# Patient Record
Sex: MALE | Race: WHITE | NOT HISPANIC OR LATINO | Employment: FULL TIME | URBAN - NONMETROPOLITAN AREA
[De-identification: names, ages, dates, MRNs, and addresses within clinical notes are randomized per-mention and may not be internally consistent; named-entity substitution may affect disease eponyms.]

---

## 2024-03-28 ENCOUNTER — OFFICE VISIT (OUTPATIENT)
Dept: INTERNAL MEDICINE | Facility: CLINIC | Age: 24
End: 2024-03-28
Payer: COMMERCIAL

## 2024-03-28 VITALS
WEIGHT: 315 LBS | TEMPERATURE: 99.2 F | HEIGHT: 72 IN | BODY MASS INDEX: 42.66 KG/M2 | DIASTOLIC BLOOD PRESSURE: 90 MMHG | OXYGEN SATURATION: 100 % | HEART RATE: 86 BPM | SYSTOLIC BLOOD PRESSURE: 119 MMHG

## 2024-03-28 DIAGNOSIS — F41.9 ANXIETY: ICD-10-CM

## 2024-03-28 DIAGNOSIS — Z00.00 ROUTINE GENERAL MEDICAL EXAMINATION AT A HEALTH CARE FACILITY: Primary | ICD-10-CM

## 2024-03-28 NOTE — PROGRESS NOTES
Chief Complaint   Patient presents with    Annual Exam     Discuss anxiety  and mental health, discuss about getting prep       Kevon Pope is a 23 y.o. male and is here for a comprehensive physical exam. The patient reports problems - anxiety .  Complains of some stressors at home but not at work says he has a good social support system.  Denies significant alcohol use and is a social drinker only.  Denies tobacco use   History:  Erectile dysfunction  no  Nocturia  no      Do you take any herbs or supplements that were not prescribed by a doctor? yes    Are you taking aspirin daily? no      Health Habits:  Dental Exam. unknown  Eye Exam. unknown  Exercise: 0 times/week.  Current exercise activities include: none    Health Maintenance   Topic Date Due    BMI FOLLOWUP  Never done    HEPATITIS C SCREENING  Never done    ANNUAL PHYSICAL  Never done    COVID-19 Vaccine (3 - 2023-24 season) 03/30/2024 (Originally 9/1/2023)    INFLUENZA VACCINE  03/31/2024 (Originally 8/1/2023)    TDAP/TD VACCINES (2 - Td or Tdap) 03/28/2025 (Originally 7/11/2022)    Pneumococcal Vaccine 0-64  Aged Out       PMH, PSH, SocHx, FamHx, Allergies, and Medications: Reviewed and updated in the Visit Navigator.     Allergies   Allergen Reactions    Penicillins Hives     History reviewed. No pertinent past medical history.  Past Surgical History:   Procedure Laterality Date    WISDOM TOOTH EXTRACTION  2018     Social History     Socioeconomic History    Marital status: Single   Tobacco Use    Smoking status: Never    Smokeless tobacco: Never   Vaping Use    Vaping status: Never Used   Substance and Sexual Activity    Alcohol use: Yes     Comment: once every 2 to 3 months    Drug use: Yes     Types: Marijuana    Sexual activity: Not Currently     Partners: Male     Family History   Problem Relation Age of Onset    Asthma Mother     No Known Problems Father        Review of Systems  Review of Systems   Constitutional: Negative.  Negative  "for activity change, appetite change, fatigue and fever.   HENT:  Negative for congestion, ear discharge, ear pain and trouble swallowing.    Eyes:  Negative for photophobia and visual disturbance.   Respiratory:  Negative for cough and shortness of breath.    Cardiovascular:  Negative for chest pain and palpitations.   Gastrointestinal:  Negative for abdominal distention, abdominal pain, constipation, diarrhea, nausea and vomiting.   Endocrine: Negative.    Genitourinary:  Negative for dysuria, hematuria and urgency.   Musculoskeletal:  Negative for arthralgias, back pain, joint swelling and myalgias.   Skin:  Negative for color change and rash.   Allergic/Immunologic: Negative.    Neurological:  Negative for dizziness, weakness, light-headedness and headaches.   Hematological:  Negative for adenopathy. Does not bruise/bleed easily.   Psychiatric/Behavioral:  Positive for dysphoric mood. Negative for agitation and confusion. The patient is nervous/anxious.        Vitals:    03/28/24 0956   BP: 119/90   Pulse: 86   Temp: 99.2 °F (37.3 °C)   SpO2: 100%       Objective   /90   Pulse 86   Temp 99.2 °F (37.3 °C) (Infrared)   Ht 182.9 cm (72\")   Wt (!) 144 kg (317 lb 12 oz)   SpO2 100%   BMI 43.09 kg/m²     Physical Exam  Constitutional:       General: He is not in acute distress.     Appearance: He is well-developed.   HENT:      Nose: Nose normal.   Eyes:      General: No scleral icterus.     Conjunctiva/sclera: Conjunctivae normal.   Neck:      Thyroid: No thyromegaly.      Trachea: No tracheal deviation.   Cardiovascular:      Rate and Rhythm: Normal rate and regular rhythm.      Heart sounds: No murmur heard.     No friction rub.   Pulmonary:      Effort: No respiratory distress.      Breath sounds: No wheezing or rales.   Abdominal:      General: There is no distension.      Palpations: Abdomen is soft. There is no mass.      Tenderness: There is no abdominal tenderness. There is no guarding. "   Musculoskeletal:         General: No deformity. Normal range of motion.   Lymphadenopathy:      Cervical: No cervical adenopathy.   Skin:     General: Skin is warm and dry.      Findings: No erythema or rash.   Neurological:      Mental Status: He is alert and oriented to person, place, and time.      Cranial Nerves: No cranial nerve deficit.      Coordination: Coordination normal.      Deep Tendon Reflexes: Reflexes are normal and symmetric.   Psychiatric:         Behavior: Behavior normal.         Thought Content: Thought content normal.         Judgment: Judgment normal.       The CVD Risk score (ANNAMARIA'Agostino, et al., 2008) failed to calculate for the following reasons:    The 2008 CVD risk score is only valid for ages 30 to 74    Lab Results   Component Value Date    CHLPL 145 06/24/2015    TRIG 97 06/24/2015    HDL 32 (L) 06/24/2015    LDL 94 06/24/2015     Glucose   Date Value Ref Range Status   06/24/2015 80 74 - 98 mg/dL Final     BUN   Date Value Ref Range Status   06/24/2015 11 7 - 20 mg/dL Final     Creatinine   Date Value Ref Range Status   06/24/2015 0.8 0.6 - 1.3 mg/dL Final     Sodium   Date Value Ref Range Status   06/24/2015 140 137 - 145 mmol/L Final     Potassium   Date Value Ref Range Status   06/24/2015 4.5 3.5 - 5.1 mmol/L Final     Chloride   Date Value Ref Range Status   06/24/2015 101 98 - 107 mmol/L Final     CO2   Date Value Ref Range Status   06/24/2015 26 26 - 30 mmol/L Final     Calcium   Date Value Ref Range Status   06/24/2015 9.7 8.4 - 10.2 mg/dL Final     Total Protein   Date Value Ref Range Status   06/24/2015 7.2 6.3 - 8.2 g/dL Final     Albumin   Date Value Ref Range Status   06/24/2015 4.2 3.5 - 5.0 g/dL Final     ALT (SGPT)   Date Value Ref Range Status   06/24/2015 61 13 - 69 U/L Final     AST (SGOT)   Date Value Ref Range Status   06/24/2015 45 15 - 46 U/L Final     Alkaline Phosphatase   Date Value Ref Range Status   06/24/2015 109 38 - 126 U/L Final     Total Bilirubin  "  Date Value Ref Range Status   06/24/2015 0.6 0.2 - 1.3 mg/dL Final     A/G Ratio   Date Value Ref Range Status   06/24/2015 1.4 1.0 - 2.0 Final     BUN/Creatinine Ratio   Date Value Ref Range Status   06/24/2015 14.6 6.3 - 21.9 Final     Anion Gap   Date Value Ref Range Status   06/24/2015 17 10 - 20 mmol/L Final     No results found for: \"WBC\", \"HGB\", \"HCT\", \"MCV\", \"PLT\"    Assessment & Plan   1. Healthy male exam.   2. Patient Counseling: Including but not Limited to the following, when appropriate:  --Nutrition: Stressed importance of moderation in sodium/caffeine intake, saturated fat and cholesterol, caloric balance, sufficient intake of fresh fruits, vegetables, fiber  .  --Exercise: Stressed the importance of regular exercise.   --Substance Abuse: Discussed cessation/primary prevention of tobacco, alcohol, or other drug use; driving or other dangerous activities under the influence; availability of treatment for abuse, as indicated based on social history.    --Sexuality: Discussed sexually transmitted diseases, partner selection, use of condoms and contraceptive alternatives.   --Injury prevention: Discussed safety belts, safety helmets, smoke detector, smoking near bedding or upholstery.   --Dental health: Discussed importance of regular tooth brushing, flossing, and dental visits.  --Immunizations reviewed.        3. Discussed the patient's BMI with him. Class 3 Severe Obesity (BMI >=40). Obesity-related health conditions include the following: none. Obesity is newly identified. BMI is is above average; BMI management plan is completed. We discussed low calorie, low carb based diet program, portion control, and increasing exercise.  . The BMI is above average; BMI management plan is completed  4. No follow-ups on file.  5. Age-appropriate Screening Scheduled  6. There are no Patient Instructions on file for this visit.    Assessment & Plan     Diagnoses and all orders for this visit:    1. Routine " general medical examination at a health care facility (Primary)  -     Comprehensive Metabolic Panel  -     Hemoglobin A1c  -     TSH Rfx On Abnormal To Free T4  -     Lipid Panel  -     Hepatitis C Antibody    2. Anxiety counseled patient in detail.  Discussed sleep hygiene start exercise program counseled about weight loss.  Does not show any suicidal ideation.  Holding off on medications for now until he is evaluated by behavioral health  -     Ambulatory Referral to Psychiatry

## 2024-03-29 LAB
ALBUMIN SERPL-MCNC: 4.6 G/DL (ref 3.5–5.2)
ALBUMIN/GLOB SERPL: 1.8 G/DL
ALP SERPL-CCNC: 72 U/L (ref 39–117)
ALT SERPL-CCNC: 29 U/L (ref 1–41)
AST SERPL-CCNC: 18 U/L (ref 1–40)
BILIRUB SERPL-MCNC: 0.6 MG/DL (ref 0–1.2)
BUN SERPL-MCNC: 12 MG/DL (ref 6–20)
BUN/CREAT SERPL: 16.4 (ref 7–25)
CALCIUM SERPL-MCNC: 9.4 MG/DL (ref 8.6–10.5)
CHLORIDE SERPL-SCNC: 105 MMOL/L (ref 98–107)
CHOLEST SERPL-MCNC: 166 MG/DL (ref 0–200)
CO2 SERPL-SCNC: 25.8 MMOL/L (ref 22–29)
CREAT SERPL-MCNC: 0.73 MG/DL (ref 0.76–1.27)
EGFRCR SERPLBLD CKD-EPI 2021: 131.1 ML/MIN/1.73
GLOBULIN SER CALC-MCNC: 2.5 GM/DL
GLUCOSE SERPL-MCNC: 90 MG/DL (ref 65–99)
HBA1C MFR BLD: 5.3 % (ref 4.8–5.6)
HCV IGG SERPL QL IA: NON REACTIVE
HDLC SERPL-MCNC: 42 MG/DL (ref 40–60)
LDLC SERPL CALC-MCNC: 113 MG/DL (ref 0–100)
POTASSIUM SERPL-SCNC: 4.3 MMOL/L (ref 3.5–5.2)
PROT SERPL-MCNC: 7.1 G/DL (ref 6–8.5)
SODIUM SERPL-SCNC: 141 MMOL/L (ref 136–145)
TRIGL SERPL-MCNC: 57 MG/DL (ref 0–150)
TSH SERPL DL<=0.005 MIU/L-ACNC: 1.42 UIU/ML (ref 0.27–4.2)
VLDLC SERPL CALC-MCNC: 11 MG/DL (ref 5–40)

## 2024-05-19 NOTE — PROGRESS NOTES
"Chief Complaint  Anxiety, mild depression, family conflict, and cannabis use      Subjective          Kevon Pope presents to [facility] by himself for an initial evaluation. The patient was referred by Dr. Monet.    History of Present Illness: \"Ferny\" states, \"I mean, I have been feeling very-especially in the recent months like within the last year-feeling super anxious about things.\" Ferny tells me that even small things will cause a lot of anxiety for him.  He begins to worry his thoughts changed from the current situation to things that happened in the past, is very emotional and begins to cry.  Eventually, everything seems to do better.  He tells me this likely began after his mother made a comment about him dating a female friend and was exacerbated by a break up with his male partner. He started thinking that she may never seen him for who he truly is. He reports having low self-esteem beginning at an early age. He feels as if he is failing in some way now because he lives at home and did not go to college. He believes his parents are disappointed in him for his sexual orientation and their Zoroastrianism beliefs. He has heard derogatory comments among family members in reference to adhikari individuals. He reports symptoms of depression too. He previously had suicidal thoughts that were intense with a plan to drive his car into a tree. The thoughts now are passive ideations without a plan and are more of a desire to escape such as, \"I wish I wouldn't wake up.\" His mood has been improving since going to the gym. He goes daily and works out for a few hours. He is close with co-workers and finds them to be a positive support. He is changing dietary habits since working out. He has always been a binge-eater or overeats due to emotions or boredom, but he is eating less and changing his diet to fuel his body. He is sleeping better too. He used to have trouble sleeping, but falls " "asleep and stays asleep now. He is not taking any psychiatric medication. He denies any SI/HI/AVH.    Past Psychiatric History: Ferny denies any past history of psychiatric care about therapy or medications.  He has never been admitted to an inpatient psychiatric hospital.  He has never had any suicide attempts.  He previously had a suicidal ideation to drive himself into a tree.  Any suicidal thoughts now are passive with more of a desire to escape.  He identifies his friends and his cat as protective against suicide.  He denies any history of self-harm.    Substance Use/Abuse: Ferny admits to drinking alcohol, socially.  He may have a cocktail with friends.  At most, he has 3 drinks per episode.  He denies any consequences on his health, relationships, or occupation.  He denies any legal consequences.  He denies any cravings for alcohol.  Ferny admits to cannabis use.  He smokes the flower.  His use has declined over time since he is getting healthier and he typically uses about 3 times per week.  He reports smoking \"blunts 4 bowls.\"  He rates his cravings for cannabis a 5 out of 10 with 10 being the highest.  He once had a.  Of abstinence for 2 weeks and denies any problems stopping.  He denies the use of nicotine or other recreational drugs.    Family Psychiatric History: Ferny tells me that his mother's side of the family has many mental health issues.  He reports that his biological mother has anxiety.  He reports a maternal uncle being in recovery from alcohol use.  He reports a maternal aunt being a \"alcoholic.\"    Developmental History: Ferny believes he was born on time via vaginal delivery in [place], [place].  He has been raised by his biological parents who are .  He does not have any siblings.  He believes he met all of his developmental milestones time.  His grandparents were very involved when he was younger.  He has always done very well in school and reports having a GPA between 3.5 and " "4.0.  He has his high school diploma.  He has considered going to college for videography or found making.  He reports having a strained relationship with his parents due to his sexual orientation and their Buddhist beliefs.  He denies any disciplinary problems in the past.  He tells me he has always been very good at making and keeping friends.  He began dating females in middle school and high school.  His first same-sex relationship was as an adult.  It lasted for 2 months.    Social History: Ferny currently lives in [place], [place] with his biological parents.  He is working at [employer], full-time.  He is interested in returning to college perhaps for videography or filmmaking.  He is interested in the creative process of making music or films.  He also likes photography.  He enjoys playing video games, watching movies with friends, going to the gym, hiking, and taking care of his cat-Sushi.  He reports the weekly use of cannabis, the occasional use of alcohol, and denies the use of nicotine or recreational drugs.    Objective   Vital Signs:   /88   Pulse 92   Ht 182.9 cm (72\")   Wt (!) 142 kg (313 lb)   SpO2 99%   BMI 42.45 kg/m²       PHQ-9 Score:   PHQ-9 Total Score: 8     JOEY-7 Score:  JOEY 7 Total Score: 7    MENTAL STATUS EXAM   General Appearance:  Cleanly groomed and dressed and well developed  Eye Contact:  Good eye contact  Attitude:  Cooperative  Motor Activity:  Normal gait, posture  Muscle Strength:  Normal  Speech:  Normal rate, tone, volume  Language:  Spontaneous  Mood and affect:  Anxious (tearful at times)  Hopelessness:  3  Loneliness: 3  Thought Process:  Logical, goal-directed and linear  Associations/ Thought Content:  No delusions  Hallucinations:  None  Suicidal Ideations:  Passive ideation  Homicidal Ideation:  Not present  Sensorium:  Alert and clear  Orientation:  Person, place, time and situation  Immediate Recall, Recent, and Remote Memory:  Intact  Attention Span/ " Concentration:  Good  Fund of Knowledge:  Appropriate for age and educational level  Intellectual Functioning:  Average range  Insight:  Fair  Judgement:  Good  Reliability:  Good  Impulse Control:  Fair       Current Medications:   Current Outpatient Medications   Medication Sig Dispense Refill    FLUoxetine (PROzac) 10 MG capsule Take 1 capsule by mouth Daily for 14 days, THEN 2 capsules Daily for 14 days. 42 capsule 0     No current facility-administered medications for this visit.   Physical Exam  Vitals and nursing note reviewed.   Constitutional:       Appearance: Normal appearance. He is obese.   Neurological:      General: No focal deficit present.      Mental Status: He is alert and oriented to person, place, and time.        Result Review :     The following data was reviewed by: XAVI Adam on 05/23/2024:    Hepatitis C Antibody (03/28/2024 10:44)  Lipid Panel (03/28/2024 10:44)  TSH Rfx On Abnormal To Free T4 (03/28/2024 10:44)  Hemoglobin A1c (03/28/2024 10:44)  Comprehensive Metabolic Panel (03/28/2024 10:44)   Office Visit with Darwin Monte MD (03/28/2024)     Assessment and Plan    Diagnoses and all orders for this visit:    1. JOEY (generalized anxiety disorder) (Primary)  -     FLUoxetine (PROzac) 10 MG capsule; Take 1 capsule by mouth Daily for 14 days, THEN 2 capsules Daily for 14 days.  Dispense: 42 capsule; Refill: 0    2. Mild depression  -     FLUoxetine (PROzac) 10 MG capsule; Take 1 capsule by mouth Daily for 14 days, THEN 2 capsules Daily for 14 days.  Dispense: 42 capsule; Refill: 0    3. Conflict between patient and family    4. Cannabis use with anxiety disorder         Impression:  -This is an initial evaluation of the patient. Ferny is a single, 23-year-old  male who presents by himself for a medication evaluation.  Ferny was referred by his primary physician for anxiety.  He tells me anxiety has been increasing over the last year and has become beyond  his control.  It interferes with daily functioning and limits him.  He would consider going back to school, but reports a fear of failure.  He discusses activities that he enjoys, but is limited with engaging in these activities due to fear.  He reports feelings of low self-esteem and lack of confidence.  He tells me this has been exacerbated by his parents Sabianist beliefs and feelings towards the adhikari community, as well as a recent break-up with a male partner.  He reports symptoms of depression too, but going to the gym and implementing a healthy lifestyle has been improving his mood.  He is sleeping better because of his healthy lifestyle.  He works full-time and reports having good coworkers and friends.  Ferny has many positive factors in his life which will help his progress.  He is interested in therapy and learning how to love himself and set realistic expectations for himself.  Today, we explored options for therapy including treatment here at [facility].  I also provided the reference [employer] in the event he would like to search the Internet for a local provider.  We discussed medication management as his anxiety levels are very high and interfering with daily functioning.  I suggested a daily medication like Prozac and an as needed medication like propranolol if needed.  I explained the mechanism of action, purpose, risk, benefits, and potential adverse effects of both medications.  He feels Prozac would be beneficial for him since it does not have a sedating factor.  However, he does not feel he needs an as needed medication at this time.  Ferny endorses passive suicidal ideations at times with more of a death wish.  He agrees to safety plan and identifies protective factors like his best friend having a baby and his cat, Dirk.  Discussed his cannabis use and I did encourage abstinence if possible as there are likely interactions with the chemicals and the cannabis.  He may consider  this and his use has significantly decreased since implementing healthy lifestyle measures.  -Initiate Prozac 10 mg daily for 2 weeks then increase to 20 mg daily for depression and anxiety.  -Consider therapy.    TREATMENT PLAN/GOALS: Continue supportive psychotherapy efforts and medications as indicated. Treatment and medication options discussed during today's visit. Patient ackowledged and verbally consented to continue with current treatment plan and was educated on the importance of compliance with treatment and follow-up appointments.    MEDICATION ISSUES:    We discussed risks, benefits, and side effects of the above medications and the patient was agreeable with the plan. Patient was educated on the importance of compliance with treatment and follow-up appointments.  Patient is agreeable to call the office with any worsening of symptoms or onset of side effects. Patient is agreeable to call 911 or go to the nearest ER should he/she begin having SI/HI.      Counseled patient regarding multimodal approach with healthy nutrition, healthy sleep, regular physical activity, social activities, counseling, and medications.      Coping skills reviewed and encouraged positive framing of thoughts     Assisted patient in processing above session content; acknowledged and normalized patient's thoughts, feelings, and concerns.  Applied  positive coping skills and behavior management in session.  Allowed patient to freely discuss issues without interruption or judgment. Provided safe, confidential environment to facilitate the development of positive therapeutic relationship and encourage open, honest communication. Assisted patient in identifying risk factors which would indicate the need for higher level of care including thoughts to harm self or others and/or self-harming behavior and encouraged patient to contact this office, call 911, or present to the nearest emergency room should any of these events occur.  Discussed crisis intervention services and means to access.     MEDS ORDERED DURING VISIT:  New Medications Ordered This Visit   Medications    FLUoxetine (PROzac) 10 MG capsule     Sig: Take 1 capsule by mouth Daily for 14 days, THEN 2 capsules Daily for 14 days.     Dispense:  42 capsule     Refill:  0           Follow Up   Return in about 4 weeks (around 6/20/2024) for Medication Check.    Patient was given instructions and counseling regarding his condition or for health maintenance advice. Please see specific information pulled into the AVS if appropriate.     This document has been electronically signed by XAVI Adam  May 23, 2024 11:47 EDT      This document has been electronically signed by XAVI Loya, PMHNP-BC  May 23, 2024 11:47 EDT    Part of this note may be an electronic transcription/translation of spoken language to printed text using the Dragon Dictation System.

## 2024-05-23 ENCOUNTER — OFFICE VISIT (OUTPATIENT)
Dept: PSYCHIATRY | Facility: CLINIC | Age: 24
End: 2024-05-23
Payer: COMMERCIAL

## 2024-05-23 VITALS
HEART RATE: 92 BPM | WEIGHT: 313 LBS | HEIGHT: 72 IN | BODY MASS INDEX: 42.39 KG/M2 | DIASTOLIC BLOOD PRESSURE: 88 MMHG | OXYGEN SATURATION: 99 % | SYSTOLIC BLOOD PRESSURE: 116 MMHG

## 2024-05-23 DIAGNOSIS — F12.980 CANNABIS USE WITH ANXIETY DISORDER: ICD-10-CM

## 2024-05-23 DIAGNOSIS — Z63.9 CONFLICT BETWEEN PATIENT AND FAMILY: ICD-10-CM

## 2024-05-23 DIAGNOSIS — F32.A MILD DEPRESSION: ICD-10-CM

## 2024-05-23 DIAGNOSIS — F41.1 GAD (GENERALIZED ANXIETY DISORDER): Primary | ICD-10-CM

## 2024-05-23 PROCEDURE — 90792 PSYCH DIAG EVAL W/MED SRVCS: CPT | Performed by: NURSE PRACTITIONER

## 2024-05-23 RX ORDER — FLUOXETINE 10 MG/1
CAPSULE ORAL
Qty: 42 CAPSULE | Refills: 0 | Status: SHIPPED | OUTPATIENT
Start: 2024-05-23 | End: 2024-06-20

## 2024-05-23 SDOH — SOCIAL STABILITY - SOCIAL INSECURITY: PROBLEM RELATED TO PRIMARY SUPPORT GROUP, UNSPECIFIED: Z63.9

## 2024-07-02 ENCOUNTER — OFFICE VISIT (OUTPATIENT)
Dept: PSYCHIATRY | Facility: CLINIC | Age: 24
End: 2024-07-02
Payer: COMMERCIAL

## 2024-07-02 DIAGNOSIS — F41.1 GAD (GENERALIZED ANXIETY DISORDER): Primary | ICD-10-CM

## 2024-07-02 DIAGNOSIS — F32.A MILD DEPRESSION: ICD-10-CM

## 2024-07-02 PROCEDURE — 90791 PSYCH DIAGNOSTIC EVALUATION: CPT | Performed by: CASE MANAGER/CARE COORDINATOR
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
## 2024-07-08 ENCOUNTER — TELEPHONE (OUTPATIENT)
Dept: PSYCHIATRY | Facility: CLINIC | Age: 24
End: 2024-07-08
Payer: COMMERCIAL

## 2024-07-08 DIAGNOSIS — F32.A MILD DEPRESSION: ICD-10-CM

## 2024-07-08 DIAGNOSIS — F41.1 GAD (GENERALIZED ANXIETY DISORDER): ICD-10-CM

## 2024-07-08 RX ORDER — FLUOXETINE HYDROCHLORIDE 20 MG/1
20 CAPSULE ORAL DAILY
Qty: 30 CAPSULE | Refills: 2 | Status: SHIPPED | OUTPATIENT
Start: 2024-07-08

## 2024-07-08 NOTE — TELEPHONE ENCOUNTER
Patient called he is out of Prozac 10 mg and needs a refill. He did not increase to 20 mg but feels the increase would help. Please send the Prozac 20 mg to [facility].

## 2024-09-27 ENCOUNTER — OFFICE VISIT (OUTPATIENT)
Dept: PSYCHIATRY | Facility: CLINIC | Age: 24
End: 2024-09-27
Payer: COMMERCIAL

## 2024-09-27 DIAGNOSIS — F41.1 GAD (GENERALIZED ANXIETY DISORDER): ICD-10-CM

## 2024-09-27 DIAGNOSIS — F32.A MILD DEPRESSION: Primary | ICD-10-CM

## 2024-09-30 NOTE — PROGRESS NOTES
"Chief Complaint  Anxiety, mild depression, family conflict, and cannabis use       Subjective          Kevon Pope presents to [facility] by himself for a follow up and medication check.    History of Present Illness: \"Ferny\" states, \"I have been better.\"  Ferny tells me that he finds the medication and therapy to be helpful.  He tells me his last therapy session was very good and he was able to identify some negative thought patterns that were bringing down his self-esteem.  He has worksheets to complete and he is finding ways to stop negative thoughts and reframe. He is taking Prozac which has helped anxiety but he has not seen changes in mood. He has felt better since his last therapy session and is unsure if the medication is working now or therapy helped. He continues to work and this is going well. He is sleeping and eating. He denies any side effects. He denies any SI/HI/AVH.     Current Medications:   Current Outpatient Medications   Medication Sig Dispense Refill    FLUoxetine (PROzac) 10 MG capsule Take 3 capsules by mouth Daily. 90 capsule 2     No current facility-administered medications for this visit.         Objective   Vital Signs:   /76   Pulse 84   Ht 182.9 cm (72\")   Wt (!) 144 kg (318 lb)   SpO2 99%   BMI 43.13 kg/m²     Physical Exam  Vitals and nursing note reviewed.   Constitutional:       Appearance: Normal appearance. He is well-developed. He is obese.   Musculoskeletal:         General: Normal range of motion.   Skin:     General: Skin is warm and dry.   Neurological:      General: No focal deficit present.      Mental Status: He is alert and oriented to person, place, and time.        Result Review :     The following data was reviewed by: XAVI Adam on 10/02/2024:    Office Visit with Aurelia Mcbride [facility] (07/02/2024)    Office Visit with Aurelia Mcbride [facility] (09/27/2024)     Assessment and Plan  "   Diagnoses and all orders for this visit:    1. Mild depression (Primary)  -     FLUoxetine (PROzac) 10 MG capsule; Take 3 capsules by mouth Daily.  Dispense: 90 capsule; Refill: 2    2. JOEY (generalized anxiety disorder)  -     FLUoxetine (PROzac) 10 MG capsule; Take 3 capsules by mouth Daily.  Dispense: 90 capsule; Refill: 2    3. Conflict between patient and family    4. Cannabis use with anxiety disorder         MENTAL STATUS EXAM   General Appearance:  Cleanly groomed and dressed and well developed  Eye Contact:  Good eye contact  Attitude:  Cooperative  Motor Activity:  Normal gait, posture  Muscle Strength:  Normal  Speech:  Normal rate, tone, volume  Language:  Spontaneous  Mood and affect:  Normal, pleasant  Hopelessness:  4  Loneliness: Denies  Thought Process:  Logical, goal-directed and linear  Associations/ Thought Content:  No delusions  Hallucinations:  None  Suicidal Ideations:  Not present  Homicidal Ideation:  Not present  Sensorium:  Alert and clear  Orientation:  Person, place, time and situation  Immediate Recall, Recent, and Remote Memory:  Intact  Attention Span/ Concentration:  Good  Fund of Knowledge:  Appropriate for age and educational level  Intellectual Functioning:  Average range  Insight:  Good  Judgement:  Good  Reliability:  Good  Impulse Control:  Good       PHQ-9 Score:   PHQ-9 Total Score: 11     JOEY-7 Score:  JOEY 7 Total Score: 7    Impression/Plan:  -this is a follow up and medication check. Ferny reports improved anxiety. He still has concerned about mood, but notes this has improved since last therapy visit. He identified negative thought patterns that were affecting him a great deal. He is working to stop them and reframe thoughts. He is sleeping and eating well. He continues to work. We agreed that one more dose adjustment on Prozac may help mood. He will continue therapy too.   -Increase Prozac to 30 mg daily for depression and anxiety.  -Continue therapy.    MEDS ORDERED  DURING VISIT:  New Medications Ordered This Visit   Medications    FLUoxetine (PROzac) 10 MG capsule     Sig: Take 3 capsules by mouth Daily.     Dispense:  90 capsule     Refill:  2         Follow Up   Return in about 2 months (around 12/2/2024) for Medication Check.  Patient was given instructions and counseling regarding his condition or for health maintenance advice. Please see specific information pulled into the AVS if appropriate.       TREATMENT PLAN/GOALS: Continue supportive psychotherapy efforts and medications as indicated. Treatment and medication options discussed during today's visit. Patient acknowledged and verbally consented to continue with current treatment plan and was educated on the importance of compliance with treatment and follow-up appointments.    MEDICATION ISSUES:  Discussed medication options and treatment plan of prescribed medication as well as the risks, benefits, and side effects including potential falls, possible impaired driving and metabolic adversities among others. Patient is agreeable to call the office with any worsening of symptoms or onset of side effects. Patient is agreeable to call 911 or go to the nearest ER should he/she begin having SI/HI.        This document has been electronically signed by XAVI Loya, PMHNP-BC  October 2, 2024 13:55 EDT    Part of this note may be an electronic transcription/translation of spoken language to printed text using the Dragon Dictation System.

## 2024-10-02 ENCOUNTER — OFFICE VISIT (OUTPATIENT)
Dept: PSYCHIATRY | Facility: CLINIC | Age: 24
End: 2024-10-02
Payer: COMMERCIAL

## 2024-10-02 VITALS
OXYGEN SATURATION: 99 % | BODY MASS INDEX: 42.66 KG/M2 | HEIGHT: 72 IN | DIASTOLIC BLOOD PRESSURE: 76 MMHG | WEIGHT: 315 LBS | HEART RATE: 84 BPM | SYSTOLIC BLOOD PRESSURE: 114 MMHG

## 2024-10-02 DIAGNOSIS — F41.1 GAD (GENERALIZED ANXIETY DISORDER): Chronic | ICD-10-CM

## 2024-10-02 DIAGNOSIS — F32.A MILD DEPRESSION: Primary | Chronic | ICD-10-CM

## 2024-10-02 DIAGNOSIS — F12.980 CANNABIS USE WITH ANXIETY DISORDER: Chronic | ICD-10-CM

## 2024-10-02 DIAGNOSIS — Z63.9 CONFLICT BETWEEN PATIENT AND FAMILY: Chronic | ICD-10-CM

## 2024-10-02 PROCEDURE — 99214 OFFICE O/P EST MOD 30 MIN: CPT | Performed by: NURSE PRACTITIONER

## 2024-10-02 RX ORDER — FLUOXETINE 10 MG/1
30 CAPSULE ORAL DAILY
Qty: 90 CAPSULE | Refills: 2 | Status: SHIPPED | OUTPATIENT
Start: 2024-10-02

## 2024-10-02 SDOH — SOCIAL STABILITY - SOCIAL INSECURITY: PROBLEM RELATED TO PRIMARY SUPPORT GROUP, UNSPECIFIED: Z63.9

## 2025-01-26 NOTE — PROGRESS NOTES
"Mode of Visit: Video   Location of patient: -HOME-   Location of provider: +[facility]   You have chosen to receive care through a telehealth visit.   The patient has signed the video visit consent form.   The visit included audio and video interaction. No technical issues occurred during this visit.      Chief Complaint  Anxiety, mild depression, family conflict, and cannabis use       Subjective          Kevon Pope presents viam [employer] Video through [employer] by himself for a follow up and medication check.    History of Present Illness: \"Ferny\" states, \"I have been doing really good.\"  Kevon describes a stable mood and managed anxiety.  He tells me that he is working in a different position at [employer].  He finds that he has more autonomy and less stressed with the new position.  He also finds the management is helpful with giving him trust and praise for his work.  He denies having any sense of dread with going to work anymore.  He states, \"this is the best I have felt with anxiety and depression ever.\"  However, he does report concerns for a blunting of emotions.  He explains that it has been difficult to cry.  He denies any problems with sleep or appetite.  He reports compliance with psychiatric medication regiment.  He is taking Prozac 30 mg daily. He denies any side effects. He denies any SI/HI/AVH.     Current Medications:   Current Outpatient Medications   Medication Sig Dispense Refill    FLUoxetine (PROzac) 20 MG capsule Take 1 capsule by mouth Daily. 90 capsule 1     No current facility-administered medications for this visit.         Objective   Vital Signs:   There were no vitals taken for this visit.    Physical Exam  Nursing note reviewed. Vitals reviewed: No vitals to review due to the nature of telehealth visit.  Constitutional:       Appearance: Normal appearance. He is well-developed. He is obese.   Neurological:      General: No focal deficit present.      Mental Status: He is alert and " oriented to person, place, and time.        Result Review :     The following data was reviewed by: XAVI Aadm on 01/27/2025:        Assessment and Plan    Diagnoses and all orders for this visit:    1. JOEY (generalized anxiety disorder) (Primary)  -     FLUoxetine (PROzac) 20 MG capsule; Take 1 capsule by mouth Daily.  Dispense: 90 capsule; Refill: 1    2. Major depressive disorder in partial remission, unspecified whether recurrent  -     FLUoxetine (PROzac) 20 MG capsule; Take 1 capsule by mouth Daily.  Dispense: 90 capsule; Refill: 1           MENTAL STATUS EXAM   General Appearance:  Cleanly groomed and dressed and well developed  Eye Contact:  Good eye contact  Attitude:  Cooperative  Motor Activity:  Normal gait, posture  Muscle Strength:  Normal  Speech:  Normal rate, tone, volume  Language:  Spontaneous  Mood and affect:  Normal, pleasant  Hopelessness:  Denies  Loneliness: Denies  Thought Process:  Logical, goal-directed and linear  Associations/ Thought Content:  No delusions  Hallucinations:  None  Suicidal Ideations:  Not present  Homicidal Ideation:  Not present  Sensorium:  Alert and clear  Orientation:  Person, place, time and situation  Immediate Recall, Recent, and Remote Memory:  Intact  Attention Span/ Concentration:  Good  Fund of Knowledge:  Appropriate for age and educational level  Intellectual Functioning:  Average range  Insight:  Good  Judgement:  Good  Reliability:  Good  Impulse Control:  Good       PHQ-9 Score:   PHQ-9 Total Score: (Patient-Rptd) 0        JOEY-7 Score:  JOEY 7 Total Score: (Patient-Rptd) 0    Impression/Plan:  -this is a follow up and medication check. Ferny reports an improvement in depression and anxiety.  He finds that between medication and job change, he has been doing very well.  He continues to work at [employer], but changed departments.  He denies any medical changes or problems with sleep or appetite.  He does have concerns for a blunting of  emotions.  He and I discussed possibly decreasing dose since he feels he is in a stable work environment.  He agrees to try, so we will decrease by 10 mg at a time.  -Decrease Prozac to 20 mg daily for depression and anxiety.  -Continue therapy.    MEDS ORDERED DURING VISIT:  New Medications Ordered This Visit   Medications    FLUoxetine (PROzac) 20 MG capsule     Sig: Take 1 capsule by mouth Daily.     Dispense:  90 capsule     Refill:  1         Follow Up   Return in about 3 months (around 4/27/2025) for Medication Check.  Patient was given instructions and counseling regarding his condition or for health maintenance advice. Please see specific information pulled into the AVS if appropriate.       TREATMENT PLAN/GOALS: Continue supportive psychotherapy efforts and medications as indicated. Treatment and medication options discussed during today's visit. Patient acknowledged and verbally consented to continue with current treatment plan and was educated on the importance of compliance with treatment and follow-up appointments.    MEDICATION ISSUES:  Discussed medication options and treatment plan of prescribed medication as well as the risks, benefits, and side effects including potential falls, possible impaired driving and metabolic adversities among others. Patient is agreeable to call the office with any worsening of symptoms or onset of side effects. Patient is agreeable to call 911 or go to the nearest ER should he/she begin having SI/HI.        This document has been electronically signed by XAVI Loya, PMHNP-BC  January 27, 2025 13:38 EST    Part of this note may be an electronic transcription/translation of spoken language to printed text using the Dragon Dictation System.

## 2025-01-27 ENCOUNTER — TELEMEDICINE (OUTPATIENT)
Dept: PSYCHIATRY | Facility: CLINIC | Age: 25
End: 2025-01-27
Payer: COMMERCIAL

## 2025-01-27 DIAGNOSIS — F41.1 GAD (GENERALIZED ANXIETY DISORDER): Primary | Chronic | ICD-10-CM

## 2025-01-27 DIAGNOSIS — F32.4 MAJOR DEPRESSIVE DISORDER IN PARTIAL REMISSION, UNSPECIFIED WHETHER RECURRENT: Chronic | ICD-10-CM

## 2025-01-27 PROCEDURE — 99214 OFFICE O/P EST MOD 30 MIN: CPT | Performed by: NURSE PRACTITIONER

## 2025-05-29 NOTE — PROGRESS NOTES
"Chief Complaint  Anxiety, mild depression, family conflict, and cannabis use       Subjective          Kevon Pope presents viam [employer] through Epic by himself for a follow up and medication check.    History of Present Illness: \"Ferny\" states, \"I have been really good.\"  Ferny tells me that he continues to work and this is going well.  He finds that he has been in a very good place with his mental health.  He denies symptoms of depression.  His anxiety has been managed.  He denies any medical changes, but does have an upcoming appointment with primary care towards the end of June.  He denies any problems with sleep, but admits that he does like to stay up late.  He denies any problems with appetite. He reports compliance with psychiatric medication regiment. He is taking Prozac 20 mg daily. He denies any side effects. He denies any SI/HI/AVH.     Current Medications:   Current Outpatient Medications   Medication Sig Dispense Refill    FLUoxetine (PROzac) 20 MG capsule Take 1 capsule by mouth Daily. 90 capsule 1     No current facility-administered medications for this visit.         Objective   Vital Signs:   There were no vitals taken for this visit.    Physical Exam  Nursing note reviewed. Vitals reviewed: No vitals to review due to the nature of telehealth visit.  Constitutional:       Appearance: Normal appearance. He is well-developed. He is obese.   Neurological:      General: No focal deficit present.      Mental Status: He is alert and oriented to person, place, and time.        Result Review :     The following data was reviewed by: XAVI Adam on 06/02/2025:        Assessment and Plan    Diagnoses and all orders for this visit:    1. JOEY (generalized anxiety disorder) (Primary)  -     FLUoxetine (PROzac) 20 MG capsule; Take 1 capsule by mouth Daily.  Dispense: 90 capsule; Refill: 1    2. Major depressive disorder in partial remission, unspecified whether recurrent  -     " FLUoxetine (PROzac) 20 MG capsule; Take 1 capsule by mouth Daily.  Dispense: 90 capsule; Refill: 1             MENTAL STATUS EXAM   General Appearance:  Cleanly groomed and dressed and well developed  Eye Contact:  Good eye contact  Attitude:  Cooperative  Motor Activity:  Normal gait, posture  Muscle Strength:  Normal  Speech:  Normal rate, tone, volume  Language:  Spontaneous  Mood and affect:  Normal, pleasant  Hopelessness:  Denies  Loneliness: Denies  Thought Process:  Logical, goal-directed and linear  Associations/ Thought Content:  No delusions  Hallucinations:  None  Suicidal Ideations:  Not present  Homicidal Ideation:  Not present  Sensorium:  Alert and clear  Orientation:  Person, place, time and situation  Immediate Recall, Recent, and Remote Memory:  Intact  Attention Span/ Concentration:  Good  Fund of Knowledge:  Appropriate for age and educational level  Intellectual Functioning:  Average range  Insight:  Good  Judgement:  Good  Reliability:  Good  Impulse Control:  Good       PHQ-9 Score:   PHQ-9 Total Score: (Patient-Rptd) 0        JOEY-7 Score:  JOEY 7 Total Score: (Patient-Rptd) 0    Impression/Plan:  -this is a follow up and medication check. Ferny reports a stable mood and managed anxiety.  He continues to work.  He has found the use of an emotional support animal to be very helpful for him, along with medication.  He denies any problems with sleep or appetite.  He continues in therapy with Rashmi.  At this time, he is pleased with his current medication regimen and would like to continue at current doses.  He is aware of providers change in location and agrees to meeting with Purvi Rodriguez at follow-up.  -Continue Prozac 20 mg daily for depression and anxiety.  -Continue therapy.  -YOLY letter complete    MEDS ORDERED DURING VISIT:  New Medications Ordered This Visit   Medications    FLUoxetine (PROzac) 20 MG capsule     Sig: Take 1 capsule by mouth Daily.     Dispense:  90 capsule      Refill:  1         Follow Up   Return in about 4 months (around 10/2/2025) for Medication Check.  Patient was given instructions and counseling regarding his condition or for health maintenance advice. Please see specific information pulled into the AVS if appropriate.       TREATMENT PLAN/GOALS: Continue supportive psychotherapy efforts and medications as indicated. Treatment and medication options discussed during today's visit. Patient acknowledged and verbally consented to continue with current treatment plan and was educated on the importance of compliance with treatment and follow-up appointments.    MEDICATION ISSUES:  Discussed medication options and treatment plan of prescribed medication as well as the risks, benefits, and side effects including potential falls, possible impaired driving and metabolic adversities among others. Patient is agreeable to call the office with any worsening of symptoms or onset of side effects. Patient is agreeable to call 911 or go to the nearest ER should he/she begin having SI/HI.        This document has been electronically signed by XAVI Loya, PMHNP-BC  June 2, 2025 13:22 EDT    Part of this note may be an electronic transcription/translation of spoken language to printed text using the Dragon Dictation System.

## 2025-06-02 ENCOUNTER — TELEMEDICINE (OUTPATIENT)
Dept: PSYCHIATRY | Facility: CLINIC | Age: 25
End: 2025-06-02
Payer: COMMERCIAL

## 2025-06-02 DIAGNOSIS — F32.4 MAJOR DEPRESSIVE DISORDER IN PARTIAL REMISSION, UNSPECIFIED WHETHER RECURRENT: Chronic | ICD-10-CM

## 2025-06-02 DIAGNOSIS — F41.1 GAD (GENERALIZED ANXIETY DISORDER): Primary | Chronic | ICD-10-CM

## 2025-06-02 PROCEDURE — 99214 OFFICE O/P EST MOD 30 MIN: CPT | Performed by: NURSE PRACTITIONER

## 2025-06-24 ENCOUNTER — TELEPHONE (OUTPATIENT)
Dept: PSYCHIATRY | Facility: CLINIC | Age: 25
End: 2025-06-24
Payer: COMMERCIAL

## 2025-06-24 NOTE — TELEPHONE ENCOUNTER
Ferny has no showed four times with Sallie Burks, and he was last seen on 9/27/24. Copies of our policy and no show letters have been sent to Ferny via [employer]. Per [facility] policy if a patient no shows or late cancels three times within a 12 month period they may be dismissed. At this time if Ferny calls the office to reschedule with Aurelia for therapy, please provide him with outside resources. Attempted to reach Ferny several times on his phone but was unable to reach him, but a voicemail was left. Also attempted to reach his emergency contact (mom) but was unable to reach her or leave a voicemail since she is not listed on his verbal release. Dismissal checklist has been completed and given to the .

## 2025-07-01 ENCOUNTER — TELEPHONE (OUTPATIENT)
Dept: PSYCHIATRY | Facility: CLINIC | Age: 25
End: 2025-07-01
Payer: COMMERCIAL